# Patient Record
Sex: MALE | Race: WHITE | Employment: UNEMPLOYED | URBAN - METROPOLITAN AREA
[De-identification: names, ages, dates, MRNs, and addresses within clinical notes are randomized per-mention and may not be internally consistent; named-entity substitution may affect disease eponyms.]

---

## 2020-09-21 ENCOUNTER — APPOINTMENT (OUTPATIENT)
Dept: GENERAL RADIOLOGY | Age: 20
End: 2020-09-21
Payer: COMMERCIAL

## 2020-09-21 ENCOUNTER — APPOINTMENT (OUTPATIENT)
Dept: CT IMAGING | Age: 20
End: 2020-09-21
Payer: COMMERCIAL

## 2020-09-21 ENCOUNTER — HOSPITAL ENCOUNTER (EMERGENCY)
Age: 20
Discharge: HOME | End: 2020-09-21
Payer: COMMERCIAL

## 2020-09-21 VITALS
BODY MASS INDEX: 29.22 KG/M2 | SYSTOLIC BLOOD PRESSURE: 147 MMHG | OXYGEN SATURATION: 98 % | WEIGHT: 240 LBS | TEMPERATURE: 97 F | HEART RATE: 108 BPM | DIASTOLIC BLOOD PRESSURE: 85 MMHG | HEIGHT: 76 IN | RESPIRATION RATE: 18 BRPM

## 2020-09-21 PROCEDURE — 73130 X-RAY EXAM OF HAND: CPT

## 2020-09-21 PROCEDURE — 72125 CT NECK SPINE W/O DYE: CPT

## 2020-09-21 PROCEDURE — 73030 X-RAY EXAM OF SHOULDER: CPT

## 2020-09-21 PROCEDURE — 29125 APPL SHORT ARM SPLINT STATIC: CPT

## 2020-09-21 PROCEDURE — 99284 EMERGENCY DEPT VISIT MOD MDM: CPT

## 2020-09-21 PROCEDURE — 70450 CT HEAD/BRAIN W/O DYE: CPT

## 2020-09-21 PROCEDURE — 6370000000 HC RX 637 (ALT 250 FOR IP): Performed by: NURSE PRACTITIONER

## 2020-09-21 RX ORDER — IBUPROFEN 600 MG/1
600 TABLET ORAL EVERY 6 HOURS PRN
Qty: 28 TABLET | Refills: 0 | Status: SHIPPED | OUTPATIENT
Start: 2020-09-21 | End: 2020-09-28

## 2020-09-21 RX ORDER — OXYCODONE HYDROCHLORIDE AND ACETAMINOPHEN 5; 325 MG/1; MG/1
2 TABLET ORAL ONCE
Status: COMPLETED | OUTPATIENT
Start: 2020-09-21 | End: 2020-09-21

## 2020-09-21 RX ADMIN — OXYCODONE HYDROCHLORIDE AND ACETAMINOPHEN 2 TABLET: 5; 325 TABLET ORAL at 12:47

## 2020-09-21 ASSESSMENT — PAIN SCALES - GENERAL: PAINLEVEL_OUTOF10: 8

## 2020-09-21 NOTE — ED PROVIDER NOTES
[facility]         Department of Emergency Medicine   ED  Provider Note  Admit Date/RoomTime: 9/21/2020 12:09 PM  ED Room: [address] [place] [address]  MRN: [id number]  Chief Complaint: Fall (tripped on shoelace and fell and injured left shoulder and left hand-didn't hit head, no loc, no blood thinners. [id number]. )       History of Present Illness   Source of history provided by:  patient. History/Exam Limitations: none. Viviana Vazquez is a 23 y.o. male who has a past medical history of: History reviewed. No pertinent past medical history. presents to the ED by private car and is alone for mechanical fall, beginning I hour ago and are unchanged since that time. Patient states he was walking with his shoelaces untied to go check the mail when he fell down approximately 8-9 stairs. He presents to the emergency department with left shoulder and left hand pain. He is unsure about head injury or loss of consciousness. He denies any neck pain, back pain, chest pain, abdominal pain, numbness, weakness or other extremity injury associated with his fall. He is not on any antiplatelets or anticoagulants. He denies any associated drug or alcohol use. He is unsure of his last tetanus vaccination. He has not taken any medication for his symptoms prior to arrival. The complaint has been persistent, moderate in severity. Glascow Coma Scale:  Best Eye Response 4 - Opens eyes on own   Best Verbal Response 5 - Alert and oriented   Best Motor Response 6 - Follows simple motor commands   Total Score 15           NEXUS Criteria For C-Spine Imaging:     []   Focal Neurologic Deficit Present? []   Midline Spinal Tenderness Present? []   Altered Level of Consciousness Present? []   Intoxication Present? [x]   Distracting Injury Present? ROS    Pertinent positives and negatives are stated within HPI, all other systems reviewed and are negative. History reviewed. No pertinent surgical history. Social History:  reports that he has quit smoking. He has never used smokeless tobacco. He reports that he does not use drugs. Family History: family history is not on file. Allergies: Patient has no known allergies. Physical Exam   Oxygen Saturation Interpretation: Normal.   ED Triage Vitals [09/21/20 1155]   BP Temp Temp Source Pulse Resp SpO2 Height Weight   (!) 147/85 97 °F (36.1 °C) Skin 103 18 98 % 6' 4\" (1.93 m) 240 lb (108.9 kg)       Physical Exam  · Constitutional/General: Alert and oriented x3, well appearing, non toxic  · HEENT: There is a cephalohematoma with ecchymosis and an abrasion to the hairline of the left frontal skull. There is no deformity and minimal tenderness to palpation. PERRLA, 4 mm. EOMI. there is no facial bone tenderness, ecchymosis abrasions or lacerations. No broken teeth. Airway patent. · Neck: Supple, full ROM, non tender to palpation in the midline, no stridor, no crepitus, no meningeal signs  · Respiratory: Lungs clear to auscultation bilaterally, no wheezes, rales, or rhonchi. Not in respiratory distress  · CV:  Regular rate no resting tachycardia on exam. Regular rhythm. No murmurs, gallops, or rubs. 2+ distal pulses both upper and lower extremities bilaterally. · Chest: No chest wall tenderness to palpation no outward sign of trauma to the anterior posterior torso. · GI:  Abdomen Soft, Non tender, Non distended. +BS. No rebound, guarding, or rigidity. No pulsatile masses. · Musculoskeletal: Moves all extremities x 4. There is limited range of motion to the left upper extremity secondary to pain at the left shoulder at the distal clavicle and proximal humerus. No empty sulcus. There is no pain to palpation of the left elbow, forearm or left wrist.  There is pain to palpation of the lateral aspect of the left hand without deformity or crepitus.   There is strong radial and ulnar pulses as well as painful range of motion and full sensation along the radial, median and ulnar nerve subluxation or dislocation. Otherwise plan is for symptom management with over-the-counter Tylenol or Motrin as written, rest, ice, elevation and outpatient follow up. Patient reports he does not work therefore is not given a note. He is aware signs and symptoms to watch for indicative of reevaluation in the emergency department setting. Patient departed in stable condition in the care of his mother. Counseling: The emergency provider has spoken with the patient and discussed todays results, in addition to providing specific details for the plan of care and counseling regarding the diagnosis and prognosis. Questions are answered at this time and they are agreeable with the plan. Assessment      1. Closed displaced fracture of middle phalanx of left ring finger, initial encounter    2. Injury of head, initial encounter    3. Fall, initial encounter    4. Shoulder strain, left, initial encounter      Plan   Discharge to home  Patient condition is stable    New Medications     Discharge Medication List as of 9/21/2020  3:13 PM      START taking these medications    Details   ibuprofen (IBU) 600 MG tablet Take 1 tablet by mouth every 6 hours as needed for Pain, Disp-28 tablet,R-0Print           Electronically signed by RAMIRO Lomeli CNP   DD: 9/21/20  **This report was transcribed using voice recognition software. Every effort was made to ensure accuracy; however, inadvertent computerized transcription errors may be present.   END OF ED PROVIDER NOTE       RAMIRO Lomeli CNP  09/21/20 9170

## 2020-09-21 NOTE — CONSULTS
Department of Orthopedic Surgery  Resident Consult Note          Reason for Consult:  Left hand pain     HISTORY OF PRESENT ILLNESS:       Patient is a 23 y.o. male who presents to the ED with  left hand pain. The patient had X rays done and orthopedics was consulted. The patient states that prior to arrival he fell down a few steps and caught himself on his left hand. He had immediate pain after the fall in the left hand and left shoulder. The patient is right hand dominant. Denies numbness/tingling/paresthesias. Denies any other orthopedic complaints at this time. Past Medical History:    History reviewed. No pertinent past medical history. Past Surgical History:    History reviewed. No pertinent surgical history. Current Medications:   No current facility-administered medications for this encounter. Allergies:  Patient has no known allergies. Social History:   TOBACCO:   reports that he has quit smoking. He has never used smokeless tobacco.  ETOH:   has no history on file for alcohol. DRUGS:   reports no history of drug use. ACTIVITIES OF DAILY LIVING:    OCCUPATION:    Family History:   History reviewed. No pertinent family history.     REVIEW OF SYSTEMS:  CONSTITUTIONAL:  negative for  fevers, chills  EYES:  negative for blurred vision, visual disturbance  HEENT:  negative for  hearing loss, voice change  RESPIRATORY:  negative for  dyspnea, wheezing  CARDIOVASCULAR:  negative for  chest pain, palpitations  GASTROINTESTINAL:  negative for nausea, vomiting  GENITOURINARY:  negative for frequency, urinary incontinence  HEMATOLOGIC/LYMPHATIC:  negative for bleeding and petechiae  MUSCULOSKELETAL:  positive for  pain  NEUROLOGICAL:  negative for headaches, dizziness  BEHAVIOR/PSYCH:  negative for increased agitation and anxiety    PHYSICAL EXAM:    VITALS:  BP (!) 147/85   Pulse 108   Temp 97 °F (36.1 °C) (Skin)   Resp 18   Ht 6' 4\" (1.93 m)   Wt 240 lb (108.9 kg)   SpO2 98%   BMI 29.21 kg/m² CONSTITUTIONAL:  awake, alert, cooperative, no apparent distress, and appears stated age  MUSCULOSKELETAL:  Left lower Extremity:  ·  Skin intact circumferentially  · +TTP over the left 4th metacarpal.  Compartments soft and compressible  · +AIN/PIN/Ulnar nerve function intact grossly  · +Radial pulse, Brisk Cap refill, hand warm and perfused  · Sensation intact to touch in radial/ulnar/median nerve distributions to hand  · When making a fist patient has no rotation deformity of the 4th digit. · Patient demonstrates full active ROM of the left shoulder. · Patient demonstrates full active ROM of the left 4th digit. Secondary Exam:   · rightUE: No obvious signs of trauma. -TTP to fingers, hand, wrist, forearm, elbow, humerus, shoulder or clavicle. compartments soft and compressible. · bilateralLE: No obvious signs of trauma. -TTP to foot, ankle, leg, knee, thigh, hip. compartments soft and compressible. DATA:    CBC: No results found for: WBC, RBC, HGB, HCT, MCV, MCH, MCHC, RDW, PLT, MPV  PT/INR:  No results found for: PROTIME, INR  CRP/ESR: No results found for: CRP, SEDRATE  Lactic Acid : No results found for: LACTA    Radiology Review:  09/21/20 - XR Left hand demonstrates a spiral fracture of the 4th metacarpal shaft with some displacement and minimal angulation or shortening  XR left shoulder demonstrates no acute fractures or dislocations. IMPRESSION:   · Left 4th metacarpal fracture    PLAN:  NWB - LUE  After informed consent was verbally obtained, Closed reduction was then performed with application of well padded ulnar gutter splint.   Patient tolerated well and remained neurovascularly intact pre and post reduction  Pain Control per ED  continue ice and elevation to decrease swelling  · Patient can follow up as an outpatient with Dr. Cong Randolph  · Discussed with Dr. Cong Randolph

## 2020-09-21 NOTE — ED NOTES
Pt hand splinted by ortho. Discharged instruction reviewed.  Pt verbalized understanding      Belen Quezada RN  09/21/20 9418

## 2020-09-22 ENCOUNTER — TELEPHONE (OUTPATIENT)
Dept: ORTHOPEDIC SURGERY | Age: 20
End: 2020-09-22

## 2020-09-22 NOTE — TELEPHONE ENCOUNTER
Attempted to reach patient regarding an appointment for his recent injury. Phone number listed is not correct and there is no other number listed for the patient.

## 2020-09-24 ENCOUNTER — OFFICE VISIT (OUTPATIENT)
Dept: ORTHOPEDIC SURGERY | Age: 20
End: 2020-09-24
Payer: COMMERCIAL

## 2020-09-24 VITALS — RESPIRATION RATE: 18 BRPM | TEMPERATURE: 97 F | HEIGHT: 76 IN | BODY MASS INDEX: 29.22 KG/M2 | WEIGHT: 240 LBS

## 2020-09-24 PROCEDURE — 1036F TOBACCO NON-USER: CPT | Performed by: ORTHOPAEDIC SURGERY

## 2020-09-24 PROCEDURE — G8419 CALC BMI OUT NRM PARAM NOF/U: HCPCS | Performed by: ORTHOPAEDIC SURGERY

## 2020-09-24 PROCEDURE — G8427 DOCREV CUR MEDS BY ELIG CLIN: HCPCS | Performed by: ORTHOPAEDIC SURGERY

## 2020-09-24 PROCEDURE — 99203 OFFICE O/P NEW LOW 30 MIN: CPT | Performed by: ORTHOPAEDIC SURGERY

## 2020-09-24 PROCEDURE — 26600 TREAT METACARPAL FRACTURE: CPT | Performed by: ORTHOPAEDIC SURGERY

## 2020-09-24 NOTE — PROGRESS NOTES
Department of Orthopedic Surgery  History and Physical      CHIEF COMPLAINT: Left hand pain    HISTORY OF PRESENT ILLNESS:                The patient is a 23 y.o. male who presents with left hand pain after patient fell while going on the stairs cut his hand on a railing. He is right-hand dominant. The injury occurred 3 days ago. 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 .    Past Medical History:    History reviewed. No pertinent past medical history. Past Surgical History:    History reviewed. No pertinent surgical history. Current Medications:   No current facility-administered medications for this visit. Allergies:  Patient has no known allergies. Social History:   TOBACCO:   reports that he has quit smoking. He has never used smokeless tobacco.  ETOH:   has no history on file for alcohol. DRUGS:   reports no history of drug use. ACTIVITIES OF DAILY LIVING:    OCCUPATION:    Family History:   No family history on file. REVIEW OF SYSTEMS:  CONSTITUTIONAL:  negative  RESPIRATORY:  negative  CARDIOVASCULAR:  negative  MUSCULOSKELETAL: Left hand pain  BEHAVIOR/PSYCH:  negative    PHYSICAL EXAM:    VITALS:  Temp 97 °F (36.1 °C) (Infrared)   Resp 18   Ht 6' 4\" (1.93 m)   Wt 240 lb (108.9 kg)   BMI 29.21 kg/m²   CONSTITUTIONAL:  awake, alert, cooperative, no apparent distress, and appears stated age  EYES:  Lids and lashes normal, pupils equal, round and reactive to light, extra ocular muscles intact, sclera clear, conjunctiva normal  ENT:  Normocephalic, without obvious abnormality, atraumatic, sinuses nontender on palpation, external ears without lesions, oral pharynx with moist mucus membranes, tonsils without erythema or exudates, gums normal and good dentition.   NECK:  Supple, symmetrical, trachea midline, no adenopathy, thyroid symmetric, not enlarged and no tenderness, skin normal  LUNGS:  CTA  CARDIOVASCULAR:  2+ radial pulses,

## 2020-10-20 ENCOUNTER — TELEPHONE (OUTPATIENT)
Dept: ORTHOPEDIC SURGERY | Age: 20
End: 2020-10-20

## 2020-10-22 ENCOUNTER — OFFICE VISIT (OUTPATIENT)
Dept: ORTHOPEDIC SURGERY | Age: 20
End: 2020-10-22
Payer: COMMERCIAL

## 2020-10-22 VITALS — TEMPERATURE: 98.5 F | HEIGHT: 76 IN | RESPIRATION RATE: 18 BRPM | BODY MASS INDEX: 29.22 KG/M2 | WEIGHT: 240 LBS

## 2020-10-22 PROCEDURE — 29086 APPLICATION CAST FINGER: CPT | Performed by: ORTHOPAEDIC SURGERY

## 2020-10-22 PROCEDURE — 99024 POSTOP FOLLOW-UP VISIT: CPT | Performed by: ORTHOPAEDIC SURGERY

## 2020-10-22 NOTE — PROGRESS NOTES
Chief Complaint   Patient presents with    Follow-up     Left fourth metacarpal shaft fracture         Luis Armando Ramsey is a 23y.o. year old  who presents for follow up of left fourth metacarpal shaft fracture. He is roughly 1 month out. He is been taken out of his cast.      History reviewed. No pertinent past medical history. History reviewed. No pertinent surgical history. Current Outpatient Medications:     ibuprofen (IBU) 600 MG tablet, Take 1 tablet by mouth every 6 hours as needed for Pain, Disp: 28 tablet, Rfl: 0  No Known Allergies  Social History     Socioeconomic History    Marital status: Single     Spouse name: Not on file    Number of children: Not on file    Years of education: Not on file    Highest education level: Not on file   Occupational History    Not on file   Social Needs    Financial resource strain: Not on file    Food insecurity     Worry: Not on file     Inability: Not on file    Transportation needs     Medical: Not on file     Non-medical: Not on file   Tobacco Use    Smoking status: Former Smoker    Smokeless tobacco: Never Used   Substance and Sexual Activity    Alcohol use: Not on file     Comment: socially.  Drug use: Never    Sexual activity: Not on file   Lifestyle    Physical activity     Days per week: Not on file     Minutes per session: Not on file    Stress: Not on file   Relationships    Social connections     Talks on phone: Not on file     Gets together: Not on file     Attends Anabaptism service: Not on file     Active member of club or organization: Not on file     Attends meetings of clubs or organizations: Not on file     Relationship status: Not on file    Intimate partner violence     Fear of current or ex partner: Not on file     Emotionally abused: Not on file     Physically abused: Not on file     Forced sexual activity: Not on file   Other Topics Concern    Not on file   Social History Narrative    Not on file     History reviewed.  No pertinent family history. Skin: (-) rash,(-) psoriasis,(-) eczema, (-)skin cancer. Musculoskeletal: (-) fractures,  (-) dislocations,(-) collagen vascular disease, (-) fibromyalgia, (-) multiple sclerosis, (-) muscular dystrophy, (-) RSD,(-) joint pain (-)swelling, (-) joint pain,swelling. Neurologic: (-) epilepsy, (-)seizures,(-) brain tumor,(-) TIA, (-)stroke, (-)headaches, (-)Parkinson disease,(-) memory loss, (-) LOC. Cardiovascular: (-) Chest pain, (-) swelling in legs/feet, (-) SOB, (-) cramping in legs/feet with walking. SUBJECTIVE:      Constitutional:    The patient is alert and oriented x 3, appears to be stated age and in no distress. Upper extremity  · Skin intact  · +2 radial pulse  · Compartment soft compressible  · Sensation intact light touch about median, radial, ulnar nerves  · Positive AIN, PIN, ulnar nerve function  · Much less tenderness palpation over the fourth metacarpal compared to the last visit  · No rotational malalignment    Xrays:     X-ray AP lateral oblique of the left hand: Demonstrates maintained alignment of fourth metacarpal shaft fracture with interval healing callus formation present. Impression: Interval healing of fourth metacarpal shaft fracture  Radiographic findings reviewed with patient      Impression:   Left fourth metacarpal shaft fracture    Plan:   After discussion with the patient about potential treatment options as far as continue cast or going into a removable brace. He stated he did not trust himself would like to be placed into another cast.  We will see him back in 3 to 4 weeks for repeat evaluation and x-rays at that time    I have seen and evaluated the patient and agree with the above assessment and plan on today's visit. I have performed the key components of the history and physical examination with significant findings of patient remains with tenderness of the fourth metacarpal shaft.   X-rays demonstrate good alignment and early callus formation. Patient reports noncompliance other than in a cast.  A new cast was applied. . I concur with the findings and plan as documented.     Shadia Yuan MD  10/22/2020

## 2020-10-22 NOTE — PROGRESS NOTES
A fiberglass ulnar gutter cast was applied to His Left arm. Neurovascular status was checked pre and post application. Patient was neurovascularly intact after the application process. The patient denied any issues with fit or comfort of the cast/splint. advised to avoid activities that put them at risk for falling. Patient instructed to call our office if there are any issues with the cast/splint.

## 2020-11-18 ENCOUNTER — TELEPHONE (OUTPATIENT)
Dept: ORTHOPEDIC SURGERY | Age: 20
End: 2020-11-18

## 2020-11-23 ENCOUNTER — OFFICE VISIT (OUTPATIENT)
Dept: ORTHOPEDIC SURGERY | Age: 20
End: 2020-11-23

## 2020-11-23 VITALS — TEMPERATURE: 96.7 F | WEIGHT: 240 LBS | BODY MASS INDEX: 29.22 KG/M2 | RESPIRATION RATE: 20 BRPM | HEIGHT: 76 IN

## 2020-11-23 PROCEDURE — 99024 POSTOP FOLLOW-UP VISIT: CPT | Performed by: PHYSICIAN ASSISTANT

## 2020-11-23 NOTE — PROGRESS NOTES
Chief Complaint   Patient presents with    Finger Injury     2 months out Left fourth metacarpal shaft fracture         Carol Pantoja is a 23y.o. year old  who presents for follow up of left fourth metacarpal shaft fracture. He is roughly 8 weeks out. He is been taken out of his cast. He reports no pain to his fracture. He does report some stiffness to his fingers. No past medical history on file. No past surgical history on file. Current Outpatient Medications:     ibuprofen (IBU) 600 MG tablet, Take 1 tablet by mouth every 6 hours as needed for Pain, Disp: 28 tablet, Rfl: 0  No Known Allergies  Social History     Socioeconomic History    Marital status: Single     Spouse name: Not on file    Number of children: Not on file    Years of education: Not on file    Highest education level: Not on file   Occupational History    Not on file   Social Needs    Financial resource strain: Not on file    Food insecurity     Worry: Not on file     Inability: Not on file    Transportation needs     Medical: Not on file     Non-medical: Not on file   Tobacco Use    Smoking status: Former Smoker    Smokeless tobacco: Never Used   Substance and Sexual Activity    Alcohol use: Not on file     Comment: socially.      Drug use: Never    Sexual activity: Not on file   Lifestyle    Physical activity     Days per week: Not on file     Minutes per session: Not on file    Stress: Not on file   Relationships    Social connections     Talks on phone: Not on file     Gets together: Not on file     Attends Anglican service: Not on file     Active member of club or organization: Not on file     Attends meetings of clubs or organizations: Not on file     Relationship status: Not on file    Intimate partner violence     Fear of current or ex partner: Not on file     Emotionally abused: Not on file     Physically abused: Not on file     Forced sexual activity: Not on file   Other Topics Concern    Not on file Social History Narrative    Not on file     No family history on file. Skin: (-) rash,(-) psoriasis,(-) eczema, (-)skin cancer. Musculoskeletal: left ring finger pain  Neurologic: (-) epilepsy, (-)seizures,(-) brain tumor,(-) TIA, (-)stroke, (-)headaches, (-)Parkinson disease,(-) memory loss, (-) LOC. Cardiovascular: (-) Chest pain, (-) swelling in legs/feet, (-) SOB, (-) cramping in legs/feet with walking. SUBJECTIVE:      Constitutional:    The patient is alert and oriented x 3, appears to be stated age and in no distress. Upper extremity  · Skin intact  · +2 radial pulse  · Compartment soft compressible  · Sensation intact light touch about median, radial, ulnar nerves  · Positive AIN, PIN, ulnar nerve function  · Negative tenderness palpation over the fourth metacarpal   · No rotational malalignment  · Flexion of the ring finger limited to 1cm from the [facility]    Xrays:     X-ray AP lateral oblique of the left hand: Demonstrates maintained alignment of fourth metacarpal shaft fracture with interval healing callus formation present. Impression: Interval healing of fourth metacarpal shaft fracture  Radiographic findings reviewed with patient      Impression:   Left fourth metacarpal shaft fracture    Plan:   Patient transitioned to a removable boxers brace. Okay to wean out of brace as tolerated. Continue to slowly advance activities over the next month before being released to unrestricted activities. HEP from ROM exercises demonstrated to the patient. Patient to follow up in 1 month if needed. All questions answered.